# Patient Record
Sex: FEMALE | Race: BLACK OR AFRICAN AMERICAN | URBAN - METROPOLITAN AREA
[De-identification: names, ages, dates, MRNs, and addresses within clinical notes are randomized per-mention and may not be internally consistent; named-entity substitution may affect disease eponyms.]

---

## 2020-01-27 ENCOUNTER — ED HISTORICAL/CONVERTED ENCOUNTER (OUTPATIENT)
Dept: OTHER | Age: 55
End: 2020-01-27

## 2023-07-05 ENCOUNTER — TRANSCRIBE ORDERS (OUTPATIENT)
Facility: HOSPITAL | Age: 58
End: 2023-07-05

## 2023-07-05 DIAGNOSIS — N64.4 MASTODYNIA: Primary | ICD-10-CM

## 2025-04-01 PROBLEM — Z87.442 HISTORY OF NEPHROLITHIASIS: Status: ACTIVE | Noted: 2025-04-01

## 2025-04-01 PROBLEM — R39.9 LOWER URINARY TRACT SYMPTOMS (LUTS): Status: ACTIVE | Noted: 2025-04-01

## 2025-05-30 ENCOUNTER — OFFICE VISIT (OUTPATIENT)
Age: 60
End: 2025-05-30
Payer: MEDICAID

## 2025-05-30 VITALS
HEART RATE: 102 BPM | HEIGHT: 63 IN | DIASTOLIC BLOOD PRESSURE: 67 MMHG | BODY MASS INDEX: 34.2 KG/M2 | SYSTOLIC BLOOD PRESSURE: 96 MMHG | WEIGHT: 193 LBS

## 2025-05-30 DIAGNOSIS — N39.46 MIXED INCONTINENCE: Primary | ICD-10-CM

## 2025-05-30 DIAGNOSIS — Z87.442 HISTORY OF NEPHROLITHIASIS: ICD-10-CM

## 2025-05-30 DIAGNOSIS — R39.9 LOWER URINARY TRACT SYMPTOMS (LUTS): ICD-10-CM

## 2025-05-30 LAB
BILIRUBIN, URINE, POC: NEGATIVE
BLOOD URINE, POC: NEGATIVE
GLUCOSE URINE, POC: 1000
KETONES, URINE, POC: NEGATIVE
LEUKOCYTE ESTERASE, URINE, POC: NEGATIVE
NITRITE, URINE, POC: NEGATIVE
PH, URINE, POC: 5.5 (ref 4.6–8)
PROTEIN,URINE, POC: NEGATIVE
SPECIFIC GRAVITY, URINE, POC: 1.01 (ref 1–1.03)
URINALYSIS CLARITY, POC: CLEAR
URINALYSIS COLOR, POC: YELLOW
UROBILINOGEN, POC: NORMAL

## 2025-05-30 PROCEDURE — 99204 OFFICE O/P NEW MOD 45 MIN: CPT | Performed by: UROLOGY

## 2025-05-30 PROCEDURE — 81003 URINALYSIS AUTO W/O SCOPE: CPT | Performed by: UROLOGY

## 2025-05-30 RX ORDER — ASPIRIN 81 MG/1
TABLET ORAL
COMMUNITY
Start: 2024-11-07

## 2025-05-30 RX ORDER — DULAGLUTIDE 0.75 MG/.5ML
INJECTION, SOLUTION SUBCUTANEOUS
COMMUNITY

## 2025-05-30 RX ORDER — RIZATRIPTAN BENZOATE 10 MG
TABLET,DISINTEGRATING ORAL
COMMUNITY
Start: 2025-05-05

## 2025-05-30 RX ORDER — LORATADINE 10 MG/1
TABLET ORAL
COMMUNITY

## 2025-05-30 RX ORDER — POLYETHYLENE GLYCOL 3350 17 G/17G
POWDER, FOR SOLUTION ORAL
COMMUNITY

## 2025-05-30 RX ORDER — LINACLOTIDE 290 UG/1
CAPSULE, GELATIN COATED ORAL
COMMUNITY

## 2025-05-30 RX ORDER — OMEPRAZOLE 40 MG/1
CAPSULE, DELAYED RELEASE ORAL
COMMUNITY

## 2025-05-30 RX ORDER — FLUTICASONE PROPIONATE 50 MCG
SPRAY, SUSPENSION (ML) NASAL
COMMUNITY

## 2025-05-30 RX ORDER — EMPAGLIFLOZIN 10 MG/1
1 TABLET, FILM COATED ORAL DAILY
COMMUNITY
Start: 2025-04-08

## 2025-05-30 RX ORDER — HYDROCHLOROTHIAZIDE 25 MG/1
TABLET ORAL
COMMUNITY

## 2025-05-30 RX ORDER — ATORVASTATIN CALCIUM 40 MG/1
TABLET, FILM COATED ORAL
COMMUNITY

## 2025-05-30 NOTE — ASSESSMENT & PLAN NOTE
She started Jardiance 30 days ago without change in urination.  She has frequency and h/o overactive bladder.  We discussed evaluation with cysto/ CMG/ uroflow.

## 2025-05-30 NOTE — PROGRESS NOTES
HISTORY OF PRESENT ILLNESS  Eleanor Varela is a 60 y.o. female.   has a past medical history of Diabetes mellitus (HCC) and Hypertension.  has no past surgical history on file.  Chief Complaint   Patient presents with    New Patient    Urinary Tract Infection     She is referred from Monico Romero NP for \"frequent UTI symptoms with negative urine cultures\"    She has a h/o UTIs.  4 years ago she recalls hematuria with an infection and saw [facility].  She had imaging and was told she had a stone.  It was followed and not passed.  7/2021 CT with \"subcentimeter\" kidney stones.     She has a sense of hot burning urine, and low back and left side pain while voiding.  She has urinary frequency 7x or more during the day, with urgency and urge incontinence.  She has nocturia x5.  It is current, and probably starting about 6 months ago.  She rates it as a mild problem.  She leaks with cough and sneeze.     She has an overactive bladder about 3 years.      Urinary Tract Infection        1. Mixed incontinence  Assessment & Plan:  No pad usage.  Some UUI and CHAD.  Evaluate with cysto/ CMG/ uroflow.   Orders:  -     AMB POC URINALYSIS DIP STICK AUTO W/O MICRO  2. Lower urinary tract symptoms (LUTS)  Overview:  2025: frequency, back pain, dysuria and abdominal discomfort with voiding.  Cultures negative for acute cystitis.  Assessment & Plan:   She started Jardiance 30 days ago without change in urination.  She has frequency and h/o overactive bladder.  We discussed evaluation with cysto/ CMG/ uroflow.   Orders:  -     AMB POC URINALYSIS DIP STICK AUTO W/O MICRO  3. History of nephrolithiasis  Assessment & Plan:   H/o bilateral stones.  Plan on CT to evaluate.   Orders:  -     CT ABDOMEN PELVIS W WO CONTRAST Additional Contrast? None; Future      No Known Allergies   Prior to Admission medications    Medication Sig Start Date End Date Taking? Authorizing Provider   JARDIANCE 10 MG tablet Take 1 tablet by mouth daily

## 2025-05-30 NOTE — PROGRESS NOTES
Chief Complaint   Patient presents with    New Patient    Urinary Tract Infection     1. Have you been to the ER, urgent care clinic since your last visit?  Hospitalized since your last visit?No    2. Have you seen or consulted any other health care providers outside of the [facility] since your last visit?  Include any pap smears or colon screening. No    BP 96/67 (BP Site: Left Upper Arm)   Pulse (!) 102   Ht 1.6 m (5' 3\")   Wt 87.5 kg (193 lb)   BMI 34.19 kg/m²

## 2025-06-03 ENCOUNTER — HOSPITAL ENCOUNTER (OUTPATIENT)
Facility: HOSPITAL | Age: 60
Discharge: HOME | End: 2025-06-06
Attending: UROLOGY
Payer: MEDICAID

## 2025-06-03 DIAGNOSIS — Z87.442 HISTORY OF NEPHROLITHIASIS: ICD-10-CM

## 2025-06-03 LAB — CREAT BLD-MCNC: 0.8 MG/DL (ref 0.6–1.3)

## 2025-06-03 PROCEDURE — 6360000004 HC RX CONTRAST MEDICATION: Performed by: UROLOGY

## 2025-06-03 PROCEDURE — 82565 ASSAY OF CREATININE: CPT

## 2025-06-03 PROCEDURE — 74178 CT ABD&PLV WO CNTR FLWD CNTR: CPT

## 2025-06-03 RX ORDER — IOPAMIDOL 755 MG/ML
100 INJECTION, SOLUTION INTRAVASCULAR
Status: COMPLETED | OUTPATIENT
Start: 2025-06-03 | End: 2025-06-03

## 2025-06-03 RX ADMIN — IOPAMIDOL 100 ML: 755 INJECTION, SOLUTION INTRAVENOUS at 08:48

## 2025-06-05 ENCOUNTER — RESULTS FOLLOW-UP (OUTPATIENT)
Age: 60
End: 2025-06-05

## 2025-07-10 ENCOUNTER — PROCEDURE VISIT (OUTPATIENT)
Age: 60
End: 2025-07-10
Payer: MEDICAID

## 2025-07-10 VITALS
SYSTOLIC BLOOD PRESSURE: 102 MMHG | HEIGHT: 63 IN | DIASTOLIC BLOOD PRESSURE: 67 MMHG | BODY MASS INDEX: 34.19 KG/M2 | HEART RATE: 103 BPM

## 2025-07-10 DIAGNOSIS — N20.0 KIDNEY STONE: ICD-10-CM

## 2025-07-10 DIAGNOSIS — N36.2 URETHRAL POLYP: ICD-10-CM

## 2025-07-10 DIAGNOSIS — R39.9 LOWER URINARY TRACT SYMPTOMS (LUTS): Primary | ICD-10-CM

## 2025-07-10 DIAGNOSIS — N39.46 MIXED INCONTINENCE: ICD-10-CM

## 2025-07-10 LAB
AVG FLOW RATE, POC: 14.2
BILIRUBIN, URINE, POC: NEGATIVE
BLOOD URINE, POC: NEGATIVE
FLOW TIME, POC: 19
GLUCOSE URINE, POC: 1000
KETONES, URINE, POC: NEGATIVE
LEUKOCYTE ESTERASE, URINE, POC: NEGATIVE
MAX FLOW RATE, POC: 23.6
NITRITE, URINE, POC: NEGATIVE
PH, URINE, POC: 5.5 (ref 4.6–8)
PROTEIN,URINE, POC: NEGATIVE
PVR, POC: NORMAL CC
SPECIFIC GRAVITY, URINE, POC: 1.02 (ref 1–1.03)
TIME TO MAX, POC: 9
URINALYSIS CLARITY, POC: CLEAR
URINALYSIS COLOR, POC: YELLOW
UROBILINOGEN, POC: NORMAL
VOIDED VOLUME, POC: 271.5
VOIDING TIME, POC: 26

## 2025-07-10 PROCEDURE — 52000 CYSTOURETHROSCOPY: CPT | Performed by: UROLOGY

## 2025-07-10 PROCEDURE — 81003 URINALYSIS AUTO W/O SCOPE: CPT | Performed by: UROLOGY

## 2025-07-10 PROCEDURE — 51798 US URINE CAPACITY MEASURE: CPT | Performed by: UROLOGY

## 2025-07-10 PROCEDURE — 51741 ELECTRO-UROFLOWMETRY FIRST: CPT | Performed by: UROLOGY

## 2025-07-10 PROCEDURE — 51725 SIMPLE CYSTOMETROGRAM: CPT | Performed by: UROLOGY

## 2025-07-10 PROCEDURE — 99214 OFFICE O/P EST MOD 30 MIN: CPT | Performed by: UROLOGY

## 2025-07-10 RX ORDER — TOPIRAMATE 25 MG/1
TABLET, FILM COATED ORAL
COMMUNITY
Start: 2025-06-13

## 2025-07-10 RX ORDER — DULAGLUTIDE 4.5 MG/.5ML
INJECTION, SOLUTION SUBCUTANEOUS
COMMUNITY
Start: 2025-06-27

## 2025-07-10 NOTE — ASSESSMENT & PLAN NOTE
Cystoscopy today with small urethral polyps.  Possibly contributory to her irritative voiding symptoms.  We discussed the risks and benefits of excision.  She wishes to proceed.

## 2025-07-10 NOTE — ASSESSMENT & PLAN NOTE
No evidence of hypermobility stress incontinence.  Urgency and urge incontinence related to bladder neck sensitivity and other factors.

## 2025-07-10 NOTE — ASSESSMENT & PLAN NOTE
Small left lower pole stone.  We discussed observation versus elective treatment.  Will observe for now.  Plan on a KUB to see if easily visible.

## 2025-07-10 NOTE — PROGRESS NOTES
Chief Complaint   Patient presents with    Procedure     cystoscopy     1. Have you been to the ER, urgent care clinic since your last visit?  Hospitalized since your last visit?No    2. Have you seen or consulted any other health care providers outside of the [facility] since your last visit?  Include any pap smears or colon screening. No  /67 (BP Site: Right Upper Arm)   Pulse (!) 103   Ht 1.6 m (5' 3\")   BMI 34.19 kg/m²     
Cystoscopy - Female    Findings:  Initial residual: minimal  Anterior urethra: mucosal polyps noted at the bladder neck, about 3 at the anterior portion  Bladder neck: Normal appearing  Bladder mucosa: Intact, no bas fond deformity, did not descend with strain  Trabeculation: none  Diverticula: none  Ureteral orifices: normal, efflux clear urine    CMG    Initial urge at (cc): 200  Strong urge at (cc): 250  Findings: No uninhibited contractions noted.  No urge related incontinence noted    Uroflow/ PVR    Max Flow: 23.6 ml/sec    Avg flow: 14.2 ml/sec    Voided Volume:  271ml    Residual Volume:31ml    Shape of the curve: Normal bell shaped curve    Impression: Normal bladder filling and emptying.  Adequate capacity.  Small bladder neck/urethral polyps      
elicited.  Grade 3-4 rectocele extending at the introitus partially with strain.  No obvious uterine prolapse.  Musculoskeletal:         General: Normal range of motion.   Neurological:      General: No focal deficit present.      Mental Status: She is alert and oriented to person, place, and time.   Psychiatric:         Mood and Affect: Mood normal.             ASSESSMENT and PLAN  1. Lower urinary tract symptoms (LUTS)  -     AMB POC URINALYSIS DIP STICK AUTO W/O MICRO  -     AMB POC PVR, KEVIN,POST-VOID RES,US,NON-IMAGING  -     AMB POC UROFLOWMETRY  2. Mixed incontinence  Assessment & Plan:  No evidence of hypermobility stress incontinence.  Urgency and urge incontinence related to bladder neck sensitivity and other factors.  Orders:  -     AMB POC URINALYSIS DIP STICK AUTO W/O MICRO  3. Kidney stone  Assessment & Plan:  Small left lower pole stone.  We discussed observation versus elective treatment.  Will observe for now.  Plan on a KUB to see if easily visible.  Orders:  -     XR ABDOMEN (KUB) (SINGLE AP VIEW); Future  4. Urethral polyp  Assessment & Plan:   Cystoscopy today with small urethral polyps.  Possibly contributory to her irritative voiding symptoms.  We discussed the risks and benefits of excision.  She wishes to proceed.      Return for surgery and postop follow up.   Federico Jensen MD       Please note that portions of this note was potentially completed with Dragon dictation, the computer voice recognition software.  Quite often unanticipated grammatical, syntax, homophones, and other interpretive errors are inadvertently transcribed by the computer software.  Please disregard these errors.  Please excuse any errors that have escaped final proofreading.  Thank you.

## 2025-07-25 ENCOUNTER — HOSPITAL ENCOUNTER (OUTPATIENT)
Facility: HOSPITAL | Age: 60
Discharge: HOME | End: 2025-07-28
Payer: MEDICAID

## 2025-07-25 DIAGNOSIS — N20.0 KIDNEY STONE: ICD-10-CM

## 2025-07-25 PROCEDURE — 74018 RADEX ABDOMEN 1 VIEW: CPT

## 2025-07-28 RX ORDER — PHENAZOPYRIDINE HYDROCHLORIDE 200 MG/1
200 TABLET, FILM COATED ORAL 3 TIMES DAILY PRN
Qty: 15 TABLET | Refills: 0 | OUTPATIENT
Start: 2025-07-28 | End: 2025-07-31

## 2025-08-05 ENCOUNTER — ANESTHESIA EVENT (OUTPATIENT)
Facility: HOSPITAL | Age: 60
End: 2025-08-05
Payer: MEDICAID

## 2025-08-05 ENCOUNTER — APPOINTMENT (OUTPATIENT)
Facility: HOSPITAL | Age: 60
End: 2025-08-05
Attending: UROLOGY
Payer: MEDICAID

## 2025-08-05 ENCOUNTER — ANESTHESIA (OUTPATIENT)
Facility: HOSPITAL | Age: 60
End: 2025-08-05
Payer: MEDICAID

## 2025-08-05 ENCOUNTER — HOSPITAL ENCOUNTER (OUTPATIENT)
Facility: HOSPITAL | Age: 60
Discharge: HOME | End: 2025-08-05
Attending: UROLOGY | Admitting: UROLOGY
Payer: MEDICAID

## 2025-08-05 VITALS
HEIGHT: 63 IN | RESPIRATION RATE: 18 BRPM | DIASTOLIC BLOOD PRESSURE: 88 MMHG | HEART RATE: 84 BPM | WEIGHT: 173 LBS | SYSTOLIC BLOOD PRESSURE: 138 MMHG | TEMPERATURE: 97.5 F | BODY MASS INDEX: 30.65 KG/M2 | OXYGEN SATURATION: 100 %

## 2025-08-05 DIAGNOSIS — N36.2 URETHRAL POLYP: ICD-10-CM

## 2025-08-05 DIAGNOSIS — N36.2 URETHRAL POLYP: Primary | ICD-10-CM

## 2025-08-05 LAB
ANION GAP BLD CALC-SCNC: 8
CA-I BLD-MCNC: 1.3 MMOL/L (ref 1.12–1.32)
CHLORIDE BLD-SCNC: 108 MMOL/L (ref 98–107)
CO2 BLD-SCNC: 28 MMOL/L
CREAT UR-MCNC: 0.65 MG/DL (ref 0.6–1.3)
EKG ATRIAL RATE: 79 BPM
EKG DIAGNOSIS: NORMAL
EKG P AXIS: 25 DEGREES
EKG P-R INTERVAL: 170 MS
EKG Q-T INTERVAL: 422 MS
EKG QRS DURATION: 88 MS
EKG QTC CALCULATION (BAZETT): 483 MS
EKG R AXIS: -23 DEGREES
EKG T AXIS: -10 DEGREES
EKG VENTRICULAR RATE: 79 BPM
GLUCOSE BLD STRIP.AUTO-MCNC: 91 MG/DL (ref 65–100)
POTASSIUM BLD-SCNC: 4.1 MMOL/L (ref 3.5–5.5)
SODIUM BLD-SCNC: 143 MMOL/L (ref 136–145)

## 2025-08-05 PROCEDURE — 3600000013 HC SURGERY LEVEL 3 ADDTL 15MIN: Performed by: UROLOGY

## 2025-08-05 PROCEDURE — 3600000003 HC SURGERY LEVEL 3 BASE: Performed by: UROLOGY

## 2025-08-05 PROCEDURE — 2500000003 HC RX 250 WO HCPCS: Performed by: NURSE ANESTHETIST, CERTIFIED REGISTERED

## 2025-08-05 PROCEDURE — 7100000000 HC PACU RECOVERY - FIRST 15 MIN: Performed by: UROLOGY

## 2025-08-05 PROCEDURE — 6370000000 HC RX 637 (ALT 250 FOR IP): Performed by: ANESTHESIOLOGY

## 2025-08-05 PROCEDURE — 7100000011 HC PHASE II RECOVERY - ADDTL 15 MIN: Performed by: UROLOGY

## 2025-08-05 PROCEDURE — 2500000003 HC RX 250 WO HCPCS: Performed by: UROLOGY

## 2025-08-05 PROCEDURE — 88305 TISSUE EXAM BY PATHOLOGIST: CPT

## 2025-08-05 PROCEDURE — 7100000010 HC PHASE II RECOVERY - FIRST 15 MIN: Performed by: UROLOGY

## 2025-08-05 PROCEDURE — 93005 ELECTROCARDIOGRAM TRACING: CPT | Performed by: UROLOGY

## 2025-08-05 PROCEDURE — 52224 CYSTOSCOPY AND TREATMENT: CPT | Performed by: UROLOGY

## 2025-08-05 PROCEDURE — 80047 BASIC METABLC PNL IONIZED CA: CPT

## 2025-08-05 PROCEDURE — 6360000002 HC RX W HCPCS: Performed by: NURSE ANESTHETIST, CERTIFIED REGISTERED

## 2025-08-05 PROCEDURE — 2580000003 HC RX 258: Performed by: NURSE ANESTHETIST, CERTIFIED REGISTERED

## 2025-08-05 PROCEDURE — 2709999900 HC NON-CHARGEABLE SUPPLY: Performed by: UROLOGY

## 2025-08-05 PROCEDURE — 3700000000 HC ANESTHESIA ATTENDED CARE: Performed by: UROLOGY

## 2025-08-05 PROCEDURE — 93010 ELECTROCARDIOGRAM REPORT: CPT | Performed by: SPECIALIST

## 2025-08-05 PROCEDURE — 6360000002 HC RX W HCPCS: Performed by: UROLOGY

## 2025-08-05 PROCEDURE — 3700000001 HC ADD 15 MINUTES (ANESTHESIA): Performed by: UROLOGY

## 2025-08-05 PROCEDURE — 7100000001 HC PACU RECOVERY - ADDTL 15 MIN: Performed by: UROLOGY

## 2025-08-05 RX ORDER — PHENYLEPHRINE HYDROCHLORIDE 10 MG/ML
INJECTION INTRAVENOUS
Status: DISCONTINUED | OUTPATIENT
Start: 2025-08-05 | End: 2025-08-05 | Stop reason: SDUPTHER

## 2025-08-05 RX ORDER — OXYCODONE HYDROCHLORIDE 5 MG/1
10 TABLET ORAL PRN
Status: COMPLETED | OUTPATIENT
Start: 2025-08-05 | End: 2025-08-05

## 2025-08-05 RX ORDER — DEXMEDETOMIDINE HYDROCHLORIDE 100 UG/ML
INJECTION, SOLUTION INTRAVENOUS
Status: DISCONTINUED | OUTPATIENT
Start: 2025-08-05 | End: 2025-08-05 | Stop reason: SDUPTHER

## 2025-08-05 RX ORDER — HYDRALAZINE HYDROCHLORIDE 20 MG/ML
10 INJECTION INTRAMUSCULAR; INTRAVENOUS
Status: DISCONTINUED | OUTPATIENT
Start: 2025-08-05 | End: 2025-08-06 | Stop reason: HOSPADM

## 2025-08-05 RX ORDER — SODIUM CHLORIDE 0.9 % (FLUSH) 0.9 %
5-40 SYRINGE (ML) INJECTION PRN
Status: DISCONTINUED | OUTPATIENT
Start: 2025-08-05 | End: 2025-08-05 | Stop reason: HOSPADM

## 2025-08-05 RX ORDER — LIDOCAINE 4 G/G
1 PATCH TOPICAL AS NEEDED
Status: DISCONTINUED | OUTPATIENT
Start: 2025-08-05 | End: 2025-08-06 | Stop reason: HOSPADM

## 2025-08-05 RX ORDER — LIDOCAINE HYDROCHLORIDE 20 MG/ML
INJECTION, SOLUTION EPIDURAL; INFILTRATION; INTRACAUDAL; PERINEURAL
Status: DISCONTINUED | OUTPATIENT
Start: 2025-08-05 | End: 2025-08-05 | Stop reason: SDUPTHER

## 2025-08-05 RX ORDER — SODIUM CHLORIDE, SODIUM LACTATE, POTASSIUM CHLORIDE, CALCIUM CHLORIDE 600; 310; 30; 20 MG/100ML; MG/100ML; MG/100ML; MG/100ML
INJECTION, SOLUTION INTRAVENOUS ONCE
Status: DISCONTINUED | OUTPATIENT
Start: 2025-08-05 | End: 2025-08-06 | Stop reason: HOSPADM

## 2025-08-05 RX ORDER — IPRATROPIUM BROMIDE AND ALBUTEROL SULFATE 2.5; .5 MG/3ML; MG/3ML
1 SOLUTION RESPIRATORY (INHALATION)
Status: DISCONTINUED | OUTPATIENT
Start: 2025-08-05 | End: 2025-08-06 | Stop reason: HOSPADM

## 2025-08-05 RX ORDER — FENTANYL CITRATE 50 UG/ML
INJECTION, SOLUTION INTRAMUSCULAR; INTRAVENOUS
Status: DISCONTINUED | OUTPATIENT
Start: 2025-08-05 | End: 2025-08-05 | Stop reason: SDUPTHER

## 2025-08-05 RX ORDER — METOCLOPRAMIDE HYDROCHLORIDE 5 MG/ML
10 INJECTION INTRAMUSCULAR; INTRAVENOUS
Status: DISCONTINUED | OUTPATIENT
Start: 2025-08-05 | End: 2025-08-06 | Stop reason: HOSPADM

## 2025-08-05 RX ORDER — SODIUM CHLORIDE 0.9 % (FLUSH) 0.9 %
5-40 SYRINGE (ML) INJECTION EVERY 12 HOURS SCHEDULED
Status: DISCONTINUED | OUTPATIENT
Start: 2025-08-05 | End: 2025-08-05 | Stop reason: HOSPADM

## 2025-08-05 RX ORDER — SODIUM CHLORIDE 0.9 % (FLUSH) 0.9 %
5-40 SYRINGE (ML) INJECTION PRN
Status: DISCONTINUED | OUTPATIENT
Start: 2025-08-05 | End: 2025-08-06 | Stop reason: HOSPADM

## 2025-08-05 RX ORDER — OXYCODONE HYDROCHLORIDE 5 MG/1
5 TABLET ORAL PRN
Status: COMPLETED | OUTPATIENT
Start: 2025-08-05 | End: 2025-08-05

## 2025-08-05 RX ORDER — CEPHALEXIN 500 MG/1
500 CAPSULE ORAL 2 TIMES DAILY
Qty: 6 CAPSULE | Refills: 0 | Status: SHIPPED | OUTPATIENT
Start: 2025-08-05 | End: 2025-08-08

## 2025-08-05 RX ORDER — LABETALOL HYDROCHLORIDE 5 MG/ML
10 INJECTION, SOLUTION INTRAVENOUS
Status: DISCONTINUED | OUTPATIENT
Start: 2025-08-05 | End: 2025-08-06 | Stop reason: HOSPADM

## 2025-08-05 RX ORDER — HYDROMORPHONE HYDROCHLORIDE 1 MG/ML
0.5 INJECTION, SOLUTION INTRAMUSCULAR; INTRAVENOUS; SUBCUTANEOUS EVERY 5 MIN PRN
Status: DISCONTINUED | OUTPATIENT
Start: 2025-08-05 | End: 2025-08-06 | Stop reason: HOSPADM

## 2025-08-05 RX ORDER — SODIUM CHLORIDE 9 MG/ML
INJECTION, SOLUTION INTRAVENOUS PRN
Status: DISCONTINUED | OUTPATIENT
Start: 2025-08-05 | End: 2025-08-06 | Stop reason: HOSPADM

## 2025-08-05 RX ORDER — MEPERIDINE HYDROCHLORIDE 25 MG/ML
12.5 INJECTION INTRAMUSCULAR; INTRAVENOUS; SUBCUTANEOUS EVERY 5 MIN PRN
Status: DISCONTINUED | OUTPATIENT
Start: 2025-08-05 | End: 2025-08-06 | Stop reason: HOSPADM

## 2025-08-05 RX ORDER — GLUCAGON 1 MG/ML
1 KIT INJECTION PRN
Status: DISCONTINUED | OUTPATIENT
Start: 2025-08-05 | End: 2025-08-06 | Stop reason: HOSPADM

## 2025-08-05 RX ORDER — ONDANSETRON 2 MG/ML
4 INJECTION INTRAMUSCULAR; INTRAVENOUS
Status: DISCONTINUED | OUTPATIENT
Start: 2025-08-05 | End: 2025-08-06 | Stop reason: HOSPADM

## 2025-08-05 RX ORDER — PROPOFOL 10 MG/ML
INJECTION, EMULSION INTRAVENOUS
Status: DISCONTINUED | OUTPATIENT
Start: 2025-08-05 | End: 2025-08-05 | Stop reason: SDUPTHER

## 2025-08-05 RX ORDER — DIPHENHYDRAMINE HYDROCHLORIDE 50 MG/ML
12.5 INJECTION, SOLUTION INTRAMUSCULAR; INTRAVENOUS
Status: DISCONTINUED | OUTPATIENT
Start: 2025-08-05 | End: 2025-08-06 | Stop reason: HOSPADM

## 2025-08-05 RX ORDER — 0.9 % SODIUM CHLORIDE 0.9 %
INTRAVENOUS SOLUTION INTRAVENOUS
Status: DISCONTINUED | OUTPATIENT
Start: 2025-08-05 | End: 2025-08-05 | Stop reason: SDUPTHER

## 2025-08-05 RX ORDER — FENTANYL CITRATE 0.05 MG/ML
50 INJECTION, SOLUTION INTRAMUSCULAR; INTRAVENOUS EVERY 5 MIN PRN
Status: DISCONTINUED | OUTPATIENT
Start: 2025-08-05 | End: 2025-08-06 | Stop reason: HOSPADM

## 2025-08-05 RX ORDER — DEXTROSE MONOHYDRATE 100 MG/ML
INJECTION, SOLUTION INTRAVENOUS CONTINUOUS PRN
Status: DISCONTINUED | OUTPATIENT
Start: 2025-08-05 | End: 2025-08-06 | Stop reason: HOSPADM

## 2025-08-05 RX ORDER — SODIUM CHLORIDE 9 MG/ML
INJECTION, SOLUTION INTRAVENOUS PRN
Status: DISCONTINUED | OUTPATIENT
Start: 2025-08-05 | End: 2025-08-05 | Stop reason: HOSPADM

## 2025-08-05 RX ORDER — FAMOTIDINE 10 MG/ML
INJECTION, SOLUTION INTRAVENOUS
Status: DISCONTINUED | OUTPATIENT
Start: 2025-08-05 | End: 2025-08-05 | Stop reason: SDUPTHER

## 2025-08-05 RX ORDER — LORAZEPAM 2 MG/ML
0.5 INJECTION INTRAMUSCULAR
Status: DISCONTINUED | OUTPATIENT
Start: 2025-08-05 | End: 2025-08-06 | Stop reason: HOSPADM

## 2025-08-05 RX ORDER — ONDANSETRON 2 MG/ML
INJECTION INTRAMUSCULAR; INTRAVENOUS
Status: DISCONTINUED | OUTPATIENT
Start: 2025-08-05 | End: 2025-08-05 | Stop reason: SDUPTHER

## 2025-08-05 RX ORDER — SODIUM CHLORIDE 0.9 % (FLUSH) 0.9 %
5-40 SYRINGE (ML) INJECTION EVERY 12 HOURS SCHEDULED
Status: DISCONTINUED | OUTPATIENT
Start: 2025-08-05 | End: 2025-08-06 | Stop reason: HOSPADM

## 2025-08-05 RX ADMIN — FENTANYL CITRATE 50 MCG: 50 INJECTION INTRAMUSCULAR; INTRAVENOUS at 14:01

## 2025-08-05 RX ADMIN — CEFAZOLIN 2000 MG: 1 INJECTION, POWDER, FOR SOLUTION INTRAMUSCULAR; INTRAVENOUS at 13:23

## 2025-08-05 RX ADMIN — FENTANYL CITRATE 25 MCG: 50 INJECTION INTRAMUSCULAR; INTRAVENOUS at 13:15

## 2025-08-05 RX ADMIN — DEXMEDETOMIDINE 4 MCG: 100 INJECTION, SOLUTION INTRAVENOUS at 13:19

## 2025-08-05 RX ADMIN — DEXMEDETOMIDINE 4 MCG: 100 INJECTION, SOLUTION INTRAVENOUS at 13:24

## 2025-08-05 RX ADMIN — PROPOFOL 50 MG: 10 INJECTION, EMULSION INTRAVENOUS at 13:27

## 2025-08-05 RX ADMIN — FENTANYL CITRATE 25 MCG: 50 INJECTION INTRAMUSCULAR; INTRAVENOUS at 13:18

## 2025-08-05 RX ADMIN — PHENYLEPHRINE HYDROCHLORIDE 50 MCG: 10 INJECTION INTRAVENOUS at 13:36

## 2025-08-05 RX ADMIN — FAMOTIDINE 20 MG: 10 INJECTION INTRAVENOUS at 13:08

## 2025-08-05 RX ADMIN — PROPOFOL 200 MG: 10 INJECTION, EMULSION INTRAVENOUS at 13:13

## 2025-08-05 RX ADMIN — SODIUM CHLORIDE 100 ML/HR: 9 INJECTION, SOLUTION INTRAVENOUS at 13:08

## 2025-08-05 RX ADMIN — PROPOFOL 100 MG: 10 INJECTION, EMULSION INTRAVENOUS at 13:15

## 2025-08-05 RX ADMIN — ONDANSETRON 4 MG: 2 INJECTION INTRAMUSCULAR; INTRAVENOUS at 13:08

## 2025-08-05 RX ADMIN — LIDOCAINE HYDROCHLORIDE 80 MG: 20 INJECTION, SOLUTION EPIDURAL; INFILTRATION; INTRACAUDAL; PERINEURAL at 13:13

## 2025-08-05 RX ADMIN — OXYCODONE 5 MG: 5 TABLET ORAL at 15:15

## 2025-08-05 RX ADMIN — PHENYLEPHRINE HYDROCHLORIDE 50 MCG: 10 INJECTION INTRAVENOUS at 13:40

## 2025-08-05 ASSESSMENT — PAIN DESCRIPTION - LOCATION: LOCATION: PELVIS

## 2025-08-05 ASSESSMENT — PAIN SCALES - GENERAL: PAINLEVEL_OUTOF10: 6

## 2025-08-05 ASSESSMENT — PAIN - FUNCTIONAL ASSESSMENT
PAIN_FUNCTIONAL_ASSESSMENT: 0-10
PAIN_FUNCTIONAL_ASSESSMENT: 0-10

## 2025-08-05 ASSESSMENT — PAIN DESCRIPTION - DESCRIPTORS
DESCRIPTORS: ACHING;SORE
DESCRIPTORS: BURNING;SHARP

## 2025-08-29 PROBLEM — N39.41 URGE INCONTINENCE: Status: ACTIVE | Noted: 2025-05-30

## 2025-09-05 ENCOUNTER — OFFICE VISIT (OUTPATIENT)
Age: 60
End: 2025-09-05
Payer: MEDICAID

## 2025-09-05 VITALS
BODY MASS INDEX: 30.65 KG/M2 | SYSTOLIC BLOOD PRESSURE: 122 MMHG | WEIGHT: 173 LBS | HEART RATE: 85 BPM | DIASTOLIC BLOOD PRESSURE: 77 MMHG | HEIGHT: 63 IN

## 2025-09-05 DIAGNOSIS — N20.0 KIDNEY STONE: ICD-10-CM

## 2025-09-05 DIAGNOSIS — N36.2 URETHRAL POLYP: Primary | ICD-10-CM

## 2025-09-05 DIAGNOSIS — N39.41 URGE INCONTINENCE: ICD-10-CM

## 2025-09-05 DIAGNOSIS — R39.9 LOWER URINARY TRACT SYMPTOMS (LUTS): ICD-10-CM

## 2025-09-05 PROCEDURE — 99214 OFFICE O/P EST MOD 30 MIN: CPT | Performed by: UROLOGY

## 2025-09-05 PROCEDURE — 81003 URINALYSIS AUTO W/O SCOPE: CPT | Performed by: UROLOGY

## 2025-09-05 RX ORDER — ESTRADIOL 10 UG/1
TABLET, FILM COATED VAGINAL
COMMUNITY
Start: 2025-08-21

## 2025-09-05 RX ORDER — LATANOPROST 50 UG/ML
SOLUTION/ DROPS OPHTHALMIC
COMMUNITY
Start: 2025-08-18

## (undated) DEVICE — Device

## (undated) DEVICE — BAG DRAINAGE 2000 CC ANTIREFLX TWR SLIDE-TAP BLNT CANN VLY

## (undated) DEVICE — GLOVE SURG SZ 75 L12IN FNGR THK79MIL GRN LTX FREE